# Patient Record
(demographics unavailable — no encounter records)

---

## 2024-12-17 NOTE — PHYSICAL EXAM
[General Appearance - Well Developed] : well developed [] : no respiratory distress [Abdomen Soft] : soft [Abdomen Tenderness] : non-tender

## 2024-12-17 NOTE — HISTORY OF PRESENT ILLNESS
[FreeTextEntry1] : 76F presents for follow up evaluation   h/o recurrent UTIs  3 breakthrough infections despite estrace use  Started on hiprex and vitamin C  Liver enzymes WNL 7/9  UCx 7/9: no growth unclear if patient is using preventative regimen as instructed  h/o recurrent LEON s/p Bulkamid x 2  Would like to observe   12/17/24 Feels well w/o breakthrough infections on estrace, hiprex, vit C

## 2024-12-17 NOTE — ASSESSMENT
[FreeTextEntry1] : Ms. Arrington presents for follow up  h/o recurrent UTIs on estrace, hiprex, vitamin C Previous PVRs all consistently WNL Denies breakthrough symptoms  h/o recurrent LEON s/p Bulkamid x 2 Would like to observe  Recommendations -continue estrace, script renewed  -continue Hiprex + Vitamins C, script renewed -RTC 6 mo  Patient is being seen today for evaluation and management of a chronic and longitudinal ongoing condition and I am of the primary treating physician.